# Patient Record
Sex: FEMALE
[De-identification: names, ages, dates, MRNs, and addresses within clinical notes are randomized per-mention and may not be internally consistent; named-entity substitution may affect disease eponyms.]

---

## 2017-12-05 ENCOUNTER — HOSPITAL ENCOUNTER (EMERGENCY)
Dept: HOSPITAL 14 - H.ER | Age: 67
Discharge: HOME | End: 2017-12-05
Payer: COMMERCIAL

## 2017-12-05 VITALS
SYSTOLIC BLOOD PRESSURE: 140 MMHG | TEMPERATURE: 97.9 F | HEART RATE: 81 BPM | RESPIRATION RATE: 18 BRPM | DIASTOLIC BLOOD PRESSURE: 63 MMHG | OXYGEN SATURATION: 99 %

## 2017-12-05 DIAGNOSIS — E78.00: ICD-10-CM

## 2017-12-05 DIAGNOSIS — N39.0: Primary | ICD-10-CM

## 2017-12-05 LAB
ALBUMIN SERPL-MCNC: 4 G/DL (ref 3.5–5)
ALBUMIN/GLOB SERPL: 1.3 {RATIO} (ref 1–2.1)
ALT SERPL-CCNC: 30 U/L (ref 9–52)
AMYLASE SERPL-CCNC: 105 U/L (ref 30–110)
AST SERPL-CCNC: 24 U/L (ref 14–36)
BACTERIA #/AREA URNS HPF: (no result) /[HPF]
BASOPHILS # BLD AUTO: 0 K/UL (ref 0–0.2)
BASOPHILS NFR BLD: 0.8 % (ref 0–2)
BILIRUB UR-MCNC: NEGATIVE MG/DL
BUN SERPL-MCNC: 13 MG/DL (ref 7–17)
CALCIUM SERPL-MCNC: 8.9 MG/DL (ref 8.4–10.2)
COLOR UR: YELLOW
EOSINOPHIL # BLD AUTO: 0.2 K/UL (ref 0–0.7)
EOSINOPHIL NFR BLD: 3.3 % (ref 0–4)
ERYTHROCYTE [DISTWIDTH] IN BLOOD BY AUTOMATED COUNT: 13.7 % (ref 11.5–14.5)
GFR NON-AFRICAN AMERICAN: > 60
GLUCOSE UR STRIP-MCNC: (no result) MG/DL
HGB BLD-MCNC: 13.1 G/DL (ref 12–16)
LEUKOCYTE ESTERASE UR-ACNC: (no result) LEU/UL
LIPASE SERPL-CCNC: 98 U/L (ref 23–300)
LYMPHOCYTES # BLD AUTO: 1.5 K/UL (ref 1–4.3)
LYMPHOCYTES NFR BLD AUTO: 25.7 % (ref 20–40)
MCH RBC QN AUTO: 28.8 PG (ref 27–31)
MCHC RBC AUTO-ENTMCNC: 32.5 G/DL (ref 33–37)
MCV RBC AUTO: 88.4 FL (ref 81–99)
MONOCYTES # BLD: 0.5 K/UL (ref 0–0.8)
MONOCYTES NFR BLD: 8.6 % (ref 0–10)
NEUTROPHILS # BLD: 3.6 K/UL (ref 1.8–7)
NEUTROPHILS NFR BLD AUTO: 61.6 % (ref 50–75)
NRBC BLD AUTO-RTO: 0.1 % (ref 0–0)
PH UR STRIP: 6 [PH] (ref 5–8)
PLATELET # BLD: 228 K/UL (ref 130–400)
PMV BLD AUTO: 9.7 FL (ref 7.2–11.7)
PROT UR STRIP-MCNC: NEGATIVE MG/DL
RBC # BLD AUTO: 4.55 MIL/UL (ref 3.8–5.2)
RBC # UR STRIP: NEGATIVE /UL
SP GR UR STRIP: 1.02 (ref 1–1.03)
SQUAMOUS EPITHIAL: 5 /HPF (ref 0–5)
URINE CLARITY: (no result)
URINE NITRATE: NEGATIVE
UROBILINOGEN UR-MCNC: (no result) MG/DL (ref 0.2–1)
WBC # BLD AUTO: 5.8 K/UL (ref 4.8–10.8)

## 2017-12-05 PROCEDURE — 80053 COMPREHEN METABOLIC PANEL: CPT

## 2017-12-05 PROCEDURE — 83690 ASSAY OF LIPASE: CPT

## 2017-12-05 PROCEDURE — 99282 EMERGENCY DEPT VISIT SF MDM: CPT

## 2017-12-05 PROCEDURE — 71010: CPT

## 2017-12-05 PROCEDURE — 81003 URINALYSIS AUTO W/O SCOPE: CPT

## 2017-12-05 PROCEDURE — 82150 ASSAY OF AMYLASE: CPT

## 2017-12-05 PROCEDURE — 74177 CT ABD & PELVIS W/CONTRAST: CPT

## 2017-12-05 PROCEDURE — 76705 ECHO EXAM OF ABDOMEN: CPT

## 2017-12-05 PROCEDURE — 84484 ASSAY OF TROPONIN QUANT: CPT

## 2017-12-05 PROCEDURE — 93005 ELECTROCARDIOGRAM TRACING: CPT

## 2017-12-05 PROCEDURE — 85025 COMPLETE CBC W/AUTO DIFF WBC: CPT

## 2017-12-05 NOTE — US
HISTORY:

pain ruq r/o margi



COMPARISON:

None available.



TECHNIQUE:

Sonographic evaluation of the right upper quadrant of the abdomen.



FINDINGS:



LIVER:

Measures 12.4 cm and appears unremarkable. No focal hepatic mass 

identified.  The main portal vein appears patent with normal 

directional flow. No intrahepatic bile duct dilatation. 



GALLBLADDER:

No gallstones. No gallbladder wall thickening or pericholecystic 

edema. Negative sonographic Caro's sign as assessed by the 

sonographer. 



COMMON BILE DUCT:

Measures 5 mm. 



PANCREAS:

Not well-visualized.



RIGHT KIDNEY:

Measures 11.6 x 5.1 x 4.7 cm. No obstructing calculus or 

hydronephrosis identified. 



AORTA:

Limited visualization appears grossly unremarkable.



IVC:

Limited visualization appears grossly unremarkable.



OTHER FINDINGS:

None .



IMPRESSION:

Unremarkable right upper quadrant ultrasound as above.

## 2017-12-05 NOTE — ED PDOC
HPI: Abdomen


Time Seen by Provider: 12/05/17 14:14


Chief Complaint (Nursing): Abdominal Pain


History Per: Patient


History/Exam Limitations: no limitations


Onset/Duration Of Symptoms: Gradual (3 weeks worse today)


Location Of Pain/Discomfort: RUQ


Quality Of Discomfort: Dull, Aching


Associated Symptoms: denies: Fever, Chills, Nausea, Vomiting, Diarrhea, Back 

Pain, Chest Pain, Constipation, Urinary Symptoms


Exacerbating Factors: None


Alleviating Factors: None


Additional History Per: Patient


Additional Complaint(s): 





c/o intermittent ruq pain x 3 weeks. Hx of diverticulitis.





Past Medical History


Reviewed: Historical Data, Nursing Documentation, Vital Signs


Vital Signs: 


 Last Vital Signs











Temp  97.9 F   12/05/17 13:58


 


Pulse  81   12/05/17 13:58


 


Resp  18   12/05/17 13:58


 


BP  140/63   12/05/17 13:58


 


Pulse Ox  99   12/05/17 16:56














- Medical History


PMH: Hypercholesterolemia





- Family History


Family History: States: Unknown Family Hx





- Living Arrangements


Living Arrangements: With Family





- Social History


Current smoker - smoking cessation education provided: No





- Allergies


Allergies/Adverse Reactions: 


 Allergies











Allergy/AdvReac Type Severity Reaction Status Date / Time


 


No Known Allergies Allergy   Verified 12/05/17 13:58














Review of Systems


ROS Statement: Except As Marked, All Systems Reviewed And Found Negative


Constitutional: Negative for: Fever, Chills


Cardiovascular: Negative for: Chest Pain, Palpitations


Respiratory: Negative for: Cough, Shortness of Breath


Gastrointestinal: Positive for: Abdominal Pain.  Negative for: Nausea, Vomiting

, Diarrhea, Melena, Hematochezia, Hematemesis


Genitourinary Female: Negative for: Dysuria


Skin: Negative for: Rash





Physical Exam





- Reviewed


Nursing Documentation Reviewed: Yes


Vital Signs Reviewed: Yes





- Physical Exam


Appears: Positive for: Uncomfortable


Head Exam: Positive for: ATRAUMATIC, NORMAL INSPECTION, NORMOCEPHALIC


Skin: Positive for: Warm, Dry


Eye Exam: Positive for: Normal appearance, EOMI, PERRL


Neck: Positive for: Normal, Painless ROM


Cardiovascular/Chest: Positive for: Regular Rate, Rhythm, Chest Non Tender.  

Negative for: Edema, Gallop, Murmur, Bradycardia, Tachycardia, Friction Rub, 

Irregularly Irregular


Respiratory: Positive for: Normal Breath Sounds.  Negative for: Decreased 

Breath Sounds, Accessory Muscle Use, Crackles, Rales, Rhonchi, Stridor, Wheezing

, Respiratory Distress


Pulses-Radial (L): 2+


Pulses-Radial (R): 2+


Gastrointestinal/Abdominal: Positive for: Normal Exam, Bowel Sounds, Soft.  

Negative for: Tenderness


Back: Positive for: Normal Inspection.  Negative for: L CVA Tenderness, R CVA 

Tenderness


Extremity: Positive for: Normal ROM.  Negative for: Tenderness, Pedal Edema, 

Calf Tenderness, Capillary Refill, Deformity, Swelling


Neurologic/Psych: Positive for: Alert, CNs II-XII, Oriented, Mood/Affect (calm)

, Gait (steady).  Negative for: Motor/Sensory Deficits





- ECG


ECG: Positive for: Interpreted By Me


ECG Rhythm: Positive for: Normal QRS, Normal ST Segment, Sinus Bradycardia (53)

.  Negative for: ST/T Changes


Interpretation Of ECG: 





no evidence of ischemia 


O2 Sat by Pulse Oximetry: 99


Pulse Ox Interpretation: Normal





- Radiology


X-Ray: Interpreted by Me


X-Ray Interpretation: No Acute Disease





Disposition





- Clinical Impression


Clinical Impression: 


 Abdominal pain in female








- Patient ED Disposition


Is Patient to be Admitted: No





- Disposition


Disposition: Routine/Home


Disposition Time: 16:56


Condition: STABLE


Forms:  [employer] (English)


Patient Signed Over To: Shubham Oconnor

## 2017-12-05 NOTE — RAD
HISTORY:

Abdominal pain.



COMPARISON:

No prior. 



FINDINGS:



LUNGS:

No active pulmonary disease.



PLEURA:

No significant pleural effusion identified, no pneumothorax apparent.



CARDIOVASCULAR:

Normal.



OSSEOUS STRUCTURES:

No significant abnormalities.



VISUALIZED UPPER ABDOMEN:

Normal.



OTHER FINDINGS:

None.



IMPRESSION:

No active disease.

## 2017-12-05 NOTE — CT
PROCEDURE:  CT Abdomen and Pelvis with contrast



HISTORY:

Right upper quadrant pain, similar to diverticulitis 



COMPARISON:

None.



TECHNIQUE:

Contrast dose: 95 cc Omnipaque 300



Radiation dose:



Total exam DLP = 989.07 mGy-cm.



This CT exam was performed using one or more of the following dose 

reduction techniques: Automated exposure control, adjustment of the 

mA and/or kV according to patient size, and/or use of iterative 

reconstruction technique.



FINDINGS:



LOWER THORAX:

Calcified granuloma right middle lobe 1 cm. 



LIVER:

Unremarkable. No gross lesion or ductal dilatation. 



GALLBLADDER AND BILE DUCTS:

Unremarkable. 



PANCREAS:

Unremarkable. No gross lesion or ductal dilatation.



SPLEEN:

Unremarkable. 



ADRENALS:

Unremarkable. No mass. 



KIDNEYS AND URETERS:

Unremarkable. No hydronephrosis. No solid mass. 



VASCULATURE:

Unremarkable. No aortic aneurysm. 



BOWEL:

Diverticulosis without an acute inflammatory component or other 

associated pathologic process. This is particularly severe in the 

vicinity of the descending colon and sigmoid colon. However there are 

no abnormalities to suggest an acute inflammatory component. 



APPENDIX:

Normal appendix. 



PERITONEUM:

Unremarkable. No free fluid. No free air. 



LYMPH NODES:

Unremarkable. No enlarged lymph nodes. 



BLADDER:

Unremarkable. 



REPRODUCTIVE:

Unremarkable. 



BONES:

No acute fracture. 



OTHER FINDINGS:

None.



IMPRESSION:

No acute findings related to/accounting  for the clinical 

presentation. 



Severe diverticulosis without evidence of acute diverticulitis.

## 2017-12-05 NOTE — ED PDOC
- Laboratory Results


Result Diagrams: 


 12/05/17 16:35





 12/05/17 16:35


Interpretation Of Abn Labs: urine wbc





- ECG


O2 Sat by Pulse Oximetry: 99


Pulse Ox Interpretation: Normal





- CT Scan/US


  ** US


Other Rad Studies (CT/US): Read By Radiologist


Other Rad Interpretation: neg





- Progress


ED Course And Treament: 





1850:  Dr. Manzo to fu on ct.   Pt. stable. and aaox3.  





Medical Decision Making


Medical Decision Making: 





Time: 1700


--Patient endorsed to provider by Dr. Javier Valiente. Pending US, CT and 

bloodwork results.





Time: 1638


--CXR: No active disease noted.





Time: 1726


--US: Unremarkable RUQ.





--------------------------------------------------------------------------------

------------------


Scribe Attestation:


Documented by Rose Guzman, acting as a scribe for Shubham Oconnor MD.





Provider Scribe Attestation:


All medical record entries made by the Scribe were at my direction and 

personally dictated by me. I have reviewed the chart and agree that the record 

accurately reflects my personal performance of the history, physical exam, 

medical decision making, and the department course for this patient. I have 

also personally directed, reviewed, and agree with the discharge instructions 

and disposition.





Disposition





- Clinical Impression


Clinical Impression: 


 Abdominal pain, UTI (urinary tract infection)








- POA


Present On Arrival: None





- Disposition


Disposition: Transfer of Care


Disposition Time: 18:50


Condition: STABLE


Patient Signed Over To: Mercy Manzo

## 2017-12-06 NOTE — CARD
--------------- APPROVED REPORT --------------





EKG Measurement

Heart [id number]

[id number]

[id number]

[id number]

[id number]



<Conclusion>

Sinus bradycardia

Otherwise normal ECG

## 2022-05-03 ENCOUNTER — HOSPITAL ENCOUNTER (EMERGENCY)
Dept: HOSPITAL 93 - ER | Age: 72
Discharge: HOME | End: 2022-05-03
Payer: COMMERCIAL

## 2022-05-03 VITALS — BODY MASS INDEX: 29.82 KG/M2 | HEIGHT: 67 IN | WEIGHT: 190 LBS

## 2022-05-03 DIAGNOSIS — Z88.0: ICD-10-CM

## 2022-05-03 DIAGNOSIS — N39.0: Primary | ICD-10-CM

## 2022-12-05 ENCOUNTER — HOSPITAL ENCOUNTER (EMERGENCY)
Dept: HOSPITAL 93 - ER | Age: 72
Discharge: HOME | End: 2022-12-05
Payer: COMMERCIAL

## 2022-12-05 VITALS — BODY MASS INDEX: 34.96 KG/M2 | WEIGHT: 190 LBS | HEIGHT: 62 IN

## 2022-12-05 DIAGNOSIS — Z20.822: ICD-10-CM

## 2022-12-05 DIAGNOSIS — B34.8: Primary | ICD-10-CM

## 2022-12-05 DIAGNOSIS — Z88.0: ICD-10-CM
